# Patient Record
Sex: FEMALE
[De-identification: names, ages, dates, MRNs, and addresses within clinical notes are randomized per-mention and may not be internally consistent; named-entity substitution may affect disease eponyms.]

---

## 2017-05-30 NOTE — PCM.SURG1
Surgeon's Initial Post Op Note





- Surgeon's Notes


Surgeon: KASHIF Vora MD


Assistant: HUNG Canales PA-C


Type of Anesthesia: General Endo


Anesthesia Administered By: Dr. Martinez


Pre-Operative Diagnosis: Left lateral tibial plateau fx


Operative Findings: Tourniquet: 110 min @ 300mmHg


Post-Operative Diagnosis: same


Operation Performed: Left tibial plateau ORIF


Specimen/Specimens Removed: none


Estimated Blood Loss: EBL {In ML}: 20


Blood Products Given: N/A


Drains Used: No Drains


Post-Op Condition: Fair


Date of Surgery/Procedure: 05/30/17


Time of Surgery/Procedure: 20:58

## 2017-05-31 NOTE — CP.PCM.PN
Subjective





- Date & Time of Evaluation


Date of Evaluation: 05/31/17


Time of Evaluation: 02:19





- Subjective


Subjective: 





S:Patient was seen at bedside.


   Has complaint of pain at operative site.


   Received 2 percocet at 10 PM and dilaudid 0.5 mg IV at midnight.


   Still having pain, dilaudid is not due yet.


   Has no other complaints now.


   Medical record was reviewed.


O:


Last Vital Signs





 3





Temp  98.1 F   05/30/17 21:35


 


Pulse  86   05/30/17 21:35


 


Resp  16   05/30/17 21:35


 


BP  126/73   05/30/17 21:35


 


Pulse Ox  99   05/30/17 21:35








   Awake, alert, not in distress.


   LUNGS:Normal breathing pattern.


   EXT:Operative site ok.





A:S/P Left tibial plateau ORIF.





P: Dilaudid 1 mg IV now.








Objective





- Vital Signs/Intake and Output


Vital Signs (last 24 hours): 


 











Temp Pulse Resp BP Pulse Ox


 


 98.1 F   86   16   126/73   99 


 


 05/30/17 21:35  05/30/17 21:35  05/30/17 21:35  05/30/17 21:35  05/30/17 21:35








Intake and Output: 


 











 05/30/17 05/31/17





 18:59 06:59


 


Intake Total 0 


 


Balance 0 














- Medications


Medications: 


 Current Medications





Acetaminophen (Tylenol 325mg Tab)  650 mg PO Q6H PRN


   PRN Reason: Temperature


Amlodipine Besylate (Norvasc)  5 mg PO BID [facility]


Aspirin (Ecotrin)  325 mg PO DAILY [facility]


Clonidine HCl (Catapres)  0.1 mg PO DAILY PRN


   PRN Reason: Systolic Blood Pressure


Docusate Sodium (Colace)  100 mg PO BID [facility]


Enoxaparin Sodium (Lovenox)  40 mg SC Q24H [facility]


   PRN Reason: Protocol


Hydromorphone HCl (Dilaudid)  0.5 mg IVP Q4H PRN


   PRN Reason: Pain, severe (8-10)


   Last Admin: 05/30/17 22:26 Dose:  0.5 mg


Cefazolin Sodium 2 gm/ Sodium (Chloride)  100 mls @ 200 mls/hr IVPB Q8H [facility]


   Stop: 05/31/17 09:29


   Last Admin: 05/31/17 00:53 Dose:  200 mls/hr


Losartan Potassium (Cozaar)  50 mg PO BID [facility]


Oxycodone/Acetaminophen (Percocet 5/325 Mg Tab)  2 tab PO Q4H PRN


   PRN Reason: Pain, moderate (4-7)


   Stop: 06/02/17 20:49


   Last Admin: 05/31/17 00:16 Dose:  2 tab


Pantoprazole Sodium (Protonix Ec Tab)  40 mg PO DAILY [facility]

## 2017-05-31 NOTE — CP.PCM.PN
Subjective





- Date & Time of Evaluation


Date of Evaluation: 05/31/17


Time of Evaluation: 08:30





- Subjective


Subjective: 





Pt seen and exxamined at bedside status post surefery for left tibial plateau 

fracture.


Pt is AAOx3 in NAD 


VSS





Left lower ext NV intact 











Objective





- Vital Signs/Intake and Output


Vital Signs (last 24 hours): 


 











Temp Pulse Resp BP Pulse Ox


 


 98.6 F   90   20   124/81   93 L


 


 05/31/17 07:51  05/31/17 09:47  05/31/17 07:51  05/31/17 09:47  05/31/17 07:51








Intake and Output: 


 











 05/31/17 05/31/17





 06:59 18:59


 


Intake Total 360 


 


Output Total 300 


 


Balance 60 














- Medications


Medications: 


 Current Medications





Acetaminophen (Tylenol 325mg Tab)  650 mg PO Q6H PRN


   PRN Reason: Temperature


Amlodipine Besylate (Norvasc)  5 mg PO BID [facility]


   Last Admin: 05/31/17 09:47 Dose:  5 mg


Aspirin (Ecotrin)  325 mg PO DAILY [facility]


   Last Admin: 05/31/17 09:46 Dose:  325 mg


Clonidine HCl (Catapres)  0.1 mg PO DAILY PRN


   PRN Reason: Systolic Blood Pressure


Docusate Sodium (Colace)  100 mg PO BID [facility]


   Last Admin: 05/31/17 09:46 Dose:  100 mg


Enoxaparin Sodium (Lovenox)  40 mg SC Q24H [facility]


   PRN Reason: Protocol


Hydromorphone HCl (Dilaudid)  2 mg IVP Q4H PRN


   PRN Reason: Pain, severe (8-10)


   Last Admin: 05/31/17 08:27 Dose:  2 mg


Losartan Potassium (Cozaar)  50 mg PO BID [facility]


   Last Admin: 05/31/17 09:46 Dose:  50 mg


Oxycodone/Acetaminophen (Percocet 5/325 Mg Tab)  2 tab PO Q4H PRN


   PRN Reason: Pain, moderate (4-7)


   Stop: 06/02/17 20:49


   Last Admin: 05/31/17 04:39 Dose:  2 tab


Pantoprazole Sodium (Protonix Ec Tab)  40 mg PO DAILY [facility]


   Last Admin: 05/31/17 09:47 Dose:  40 mg











- Constitutional


Appears: Well, Non-toxic





- Head Exam


Head Exam: ATRAUMATIC





- Eye Exam


Eye Exam: Normal appearance


Pupil Exam: NORMAL ACCOMODATION





- ENT Exam


ENT Exam: Mucous Membranes Moist





- Neck Exam


Neck Exam: Normal Inspection





- Respiratory Exam


Respiratory Exam: NORMAL BREATHING PATTERN





- Cardiovascular Exam


Cardiovascular Exam: REGULAR RHYTHM





- GI/Abdominal Exam


GI & Abdominal Exam: Normal Bowel Sounds





-  Exam


 Exam: NORMAL INSPECTION





- Extremities Exam


Extremities Exam: Normal Capillary Refill, Normal Inspection, Tenderness (left 

knee with immobilizer, distally pt is NV intact )





- Back Exam


Back Exam: NORMAL INSPECTION





- Neurological Exam


Neurological Exam: Alert, Awake, Oriented x3


Neuro motor strength exam: Left Upper Extremity: 5, Right Upper Extremity: 5





- Skin


Skin Exam: Dry, Intact, Normal Color, Warm





Assessment and Plan





- Assessment and Plan (Free Text)


Plan: 


A/P


 


Ms Malave is a 56 year old female who sustained mech fall with left tibial 

plateau fracture requiring surgical intervention, 


pt is post op day #1 and was seen by p.t with nwb to left lower extremity 





Per discussion with Dr Edwards , patient for dc home today 





will discharge home on full aspirin, Keflex and pain medication


pt to follow up with DR Edwards in 1 week for wound check and will continue 

non wt bearing to left extremity 











Discussed with Dr edwards in rounds 








HORTENSIA Rodrigez, DNP

## 2017-05-31 NOTE — CP.PCM.PN
Subjective





- Subjective


Subjective: 





ORIF left tibia.


Dilaudid 0.5 mg. 10 pm percocet 2  @ [place],








Objective





- Vital Signs/Intake and Output


Vital Signs (last 24 hours): 


 











Temp Pulse Resp BP Pulse Ox


 


 98.1 F   86   16   126/73   99 


 


 05/30/17 21:35  05/30/17 21:35  05/30/17 21:35  05/30/17 21:35  05/30/17 21:35








Intake and Output: 


 











 05/30/17 05/31/17





 18:59 06:59


 


Intake Total 0 


 


Balance 0 














- Medications


Medications: 


 Current Medications





Acetaminophen (Tylenol 325mg Tab)  650 mg PO Q6H PRN


   PRN Reason: Temperature


Amlodipine Besylate (Norvasc)  5 mg PO BID [facility]


Aspirin (Ecotrin)  325 mg PO DAILY [facility]


Clonidine HCl (Catapres)  0.1 mg PO DAILY PRN


   PRN Reason: Systolic Blood Pressure


Docusate Sodium (Colace)  100 mg PO BID [facility]


Enoxaparin Sodium (Lovenox)  40 mg SC Q24H [facility]


   PRN Reason: Protocol


Hydromorphone HCl (Dilaudid)  0.5 mg IVP Q4H PRN


   PRN Reason: Pain, severe (8-10)


   Last Admin: 05/30/17 22:26 Dose:  0.5 mg


Cefazolin Sodium 2 gm/ Sodium (Chloride)  100 mls @ 200 mls/hr IVPB Q8H [facility]


   Stop: 05/31/17 09:29


   Last Admin: 05/31/17 00:53 Dose:  200 mls/hr


Losartan Potassium (Cozaar)  50 mg PO BID [facility]


Oxycodone/Acetaminophen (Percocet 5/325 Mg Tab)  2 tab PO Q4H PRN


   PRN Reason: Pain, moderate (4-7)


   Stop: 06/02/17 20:49


   Last Admin: 05/31/17 00:16 Dose:  2 tab


Pantoprazole Sodium (Protonix Ec Tab)  40 mg PO DAILY [facility]

## 2017-05-31 NOTE — CON
DATE: 2017



HISTORY OF PRESENT ILLNESS:  The patient is 56 years old, who states that she was with her friend jose manuel alvarado in the backyard and the friend's big dog, who is almost 35-40 pounds in weight, slammed into her
 and she fell and she was unable to stand up.  She came to Emergency Room and was found to have a com
minuted fracture of left lateral tibial plateau.  She was seen by Dr. Vora and the patient underw
ent open reduction and internal fixation of left tibial plateau fracture.



PAST MEDICAL HISTORY:   She has significant past medical history includin.  Hypertension.

2.  COPD.

3.  History of cancer of CA breast, status post lumpectomy and chemo and chemo-radiation.

4.  Hyperlipidemia.



ALLERGIES:  SHE IS ALLERGIC TO FLUTICASONE, PREDNISONE AND SALMETEROL.  



SOCIAL HISTORY:  She is  and lives with her .



MEDICATIONS AT HOME:  She is on aspirin, Protonix 40 daily, Percocet as needed, amlodipine 5 mg twice
 a day, valsartan 80 mg twice a day, lactobacillus, Dilaudid 2 mg q.4 hours p.r.n., Keflex 500 b.i.d.
, Aspirin 325 daily.



REVIEW OF SYSTEMS:  Significant for pain in the left knee that is in an immobilizer.



PHYSICAL EXAMINATION:

GENERAL:  She is awake and alert and able to communicate.

VITAL SIGNS:  She is afebrile, pulse 90, respirations 20, blood pressure 124/81.

LUNGS:  Bilateral fair airflow, no rhonchi or crackle.

HEART:  S1, S2 audible.  No murmur.

ABDOMEN:  Soft, nontender, no rebound, no guarding.

NEUROLOGIC:  The patient is awake and alert, able to communicate.

EXTREMITIES:  Left leg is in an immobilizer.



LABORATORY DATA:  WBC 6.2, hemoglobin 14, hematocrit 43, platelet of 258.  Sodium 141, potassium 4.2,
 chloride _____, CO2 of _____, BUN 10, creatinine 0.6.



PLAN:  The patient is cleared by orthopedics, being discharged home today.  She will resume all her m
edication as prior to admission that includes Protonix.  She was given Percocet 2 tablets q.4 hours p
.r.n.  She is on valsartan 80 b.i.d. and amlodipine 5 mg b.i.d.  She is given a prescription of Dilau
did 2 mg q.4 hours for breakthrough pain.  Will also start her on Eliquis 5 mg twice a day for a week
 until she was ambulatory.





__________________________________________

Terence العلي MD







cc:



DD: 2017 12:52:09  [id number]

TT: 2017 17:46:22

Confirmation # [id number]

Dictation # [id number]

dn

## 2017-05-31 NOTE — RAD
PROCEDURE:  Left knee 05/30/2017. 



AP and cross-table lateral portable views of the left knee performed. 



HISTORY:

Pain.



COMPARISON:

Comparison made with CT scan of the left knee 05/22/2017.



FINDINGS:



BONES:

There has been ORIF previously noted comminuted depressed fracture 

left lateral tibial plateau.  There has been placement of a lateral 

fixation plate which is attached to the tibial plateau and tibial the 

Ashley metaphysis by multiple threaded screws. Hardware appears 

intact. The satisfactory alignment. 



JOINTS:

Normal. No osteoarthritis. 



JOINT EFFUSION:

None. 



OTHER FINDINGS:

None.



IMPRESSION:

ORIF comminuted fracture left lateral tibial plateau as above

## 2018-07-31 NOTE — RAD
PROCEDURE:  Intraoperative Fluoroscopy. 



HISTORY:

O.R.I.F.  LEFT  TIBIAL PLATEAU FX.



FINDINGS:

Fluoroscopic assistance was provided for ORIF left tibial fracture 



Approximately 1 minutes 58 seconds of fluoroscopy time utilized for 

this procedure.



Please refer to the operative report for additional details. Pt seen in ER [facility] related

## 2019-01-09 ENCOUNTER — HOSPITAL ENCOUNTER (OUTPATIENT)
Dept: HOSPITAL 42 - RAD | Age: 59
End: 2019-01-09
Payer: COMMERCIAL

## 2019-01-09 DIAGNOSIS — E04.1: Primary | ICD-10-CM
